# Patient Record
Sex: MALE | Race: BLACK OR AFRICAN AMERICAN | URBAN - METROPOLITAN AREA
[De-identification: names, ages, dates, MRNs, and addresses within clinical notes are randomized per-mention and may not be internally consistent; named-entity substitution may affect disease eponyms.]

---

## 2022-05-03 ENCOUNTER — OFFICE VISIT (OUTPATIENT)
Dept: URBAN - METROPOLITAN AREA CLINIC 17 | Facility: CLINIC | Age: 22
End: 2022-05-03
Payer: COMMERCIAL

## 2022-05-03 ENCOUNTER — DASHBOARD ENCOUNTERS (OUTPATIENT)
Age: 22
End: 2022-05-03

## 2022-05-03 ENCOUNTER — WEB ENCOUNTER (OUTPATIENT)
Dept: URBAN - METROPOLITAN AREA CLINIC 17 | Facility: CLINIC | Age: 22
End: 2022-05-03

## 2022-05-03 DIAGNOSIS — R12 HEARTBURN: ICD-10-CM

## 2022-05-03 DIAGNOSIS — K59.09 CHRONIC CONSTIPATION: ICD-10-CM

## 2022-05-03 DIAGNOSIS — R14.0 BLOATING: ICD-10-CM

## 2022-05-03 PROCEDURE — 99203 OFFICE O/P NEW LOW 30 MIN: CPT | Performed by: INTERNAL MEDICINE

## 2022-05-03 RX ORDER — FLUTICASONE PROPIONATE 50 UG/1
SPRAY 1 SPRAY INTO EACH NOSTRIL EVERY DAY SPRAY, METERED NASAL
Qty: 15.8 | Refills: 0 | Status: ACTIVE | COMMUNITY

## 2022-05-03 RX ORDER — ALBUTEROL SULFATE 108 UG/1
INHALE 1 PUFF (90 MCG) BY INHALATION ROUTE EVERY 6 HOURS AS NEEDED FOR 30 DAYS INHALANT RESPIRATORY (INHALATION)
Qty: 6.7 | Refills: 0 | Status: ACTIVE | COMMUNITY

## 2022-05-03 NOTE — HPI-TODAY'S VISIT:
5/3/22 23 yo male here with his mum. PCP is Dr Guy Hoffman.  Since 2020 he has had GI complaints - bloating He would have to use the bathroom every 30 mins and have an upset stomach "and cant use the bathroom most of the time". Eventually he will have a BM which will help with his upset stomach After a BM he feels incompletely evacuated and will have to go back to evacuate further.  When he eats it helps him have a BM but then the cycle continues.  To help he has tried to eat healthier, more salads and less meat which have helped a little.  Also drinking more water has helped.  PCP told him to try Tums and pepcd - and if it didnt help to see GI.  By a stomach upset he means he feels the urge to have a BM that is unproductive.  Last time he felt good was a few summers ago. Even then he had a BM about once a week.  He drinks about 24 ounces of water a day.  No blood in stool. No fhx of colon CA.  He also has heartburn daily. Eats dinner 8 pm. Snacks till 10 or 11 pm.

## 2022-05-05 ENCOUNTER — WEB ENCOUNTER (OUTPATIENT)
Dept: URBAN - METROPOLITAN AREA CLINIC 17 | Facility: CLINIC | Age: 22
End: 2022-05-05

## 2022-05-05 ENCOUNTER — OFFICE VISIT (OUTPATIENT)
Dept: URBAN - METROPOLITAN AREA CLINIC 17 | Facility: CLINIC | Age: 22
End: 2022-05-05